# Patient Record
Sex: MALE | Race: WHITE | URBAN - METROPOLITAN AREA
[De-identification: names, ages, dates, MRNs, and addresses within clinical notes are randomized per-mention and may not be internally consistent; named-entity substitution may affect disease eponyms.]

---

## 2021-04-13 ENCOUNTER — EMERGENCY (EMERGENCY)
Facility: HOSPITAL | Age: 21
LOS: 0 days | Discharge: HOME | End: 2021-04-13
Attending: EMERGENCY MEDICINE
Payer: COMMERCIAL

## 2021-04-13 VITALS
OXYGEN SATURATION: 100 % | SYSTOLIC BLOOD PRESSURE: 134 MMHG | RESPIRATION RATE: 18 BRPM | HEART RATE: 95 BPM | HEIGHT: 70 IN | DIASTOLIC BLOOD PRESSURE: 92 MMHG | TEMPERATURE: 98 F | WEIGHT: 199.96 LBS

## 2021-04-13 VITALS
HEART RATE: 95 BPM | DIASTOLIC BLOOD PRESSURE: 87 MMHG | RESPIRATION RATE: 17 BRPM | SYSTOLIC BLOOD PRESSURE: 127 MMHG | OXYGEN SATURATION: 100 % | TEMPERATURE: 98 F

## 2021-04-13 DIAGNOSIS — Z91.018 ALLERGY TO OTHER FOODS: ICD-10-CM
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
## 2021-04-13 DIAGNOSIS — E78.5 HYPERLIPIDEMIA, UNSPECIFIED: ICD-10-CM

## 2021-04-13 DIAGNOSIS — S42.022A DISPLACED FRACTURE OF SHAFT OF LEFT CLAVICLE, INITIAL ENCOUNTER FOR CLOSED FRACTURE: ICD-10-CM
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
## 2021-04-13 DIAGNOSIS — M25.512 PAIN IN LEFT SHOULDER: ICD-10-CM

## 2021-04-13 PROCEDURE — 71046 X-RAY EXAM CHEST 2 VIEWS: CPT | Mod: 26

## 2021-04-13 PROCEDURE — 73030 X-RAY EXAM OF SHOULDER: CPT | Mod: 26,LT

## 2021-04-13 PROCEDURE — 99284 EMERGENCY DEPT VISIT MOD MDM: CPT

## 2021-04-13 PROCEDURE — 73610 X-RAY EXAM OF ANKLE: CPT | Mod: LT

## 2021-04-13 PROCEDURE — 73610 X-RAY EXAM OF ANKLE: CPT | Mod: 26,LT

## 2021-04-13 PROCEDURE — 73000 X-RAY EXAM OF COLLAR BONE: CPT | Mod: 26,LT

## 2021-04-13 PROCEDURE — 73030 X-RAY EXAM OF SHOULDER: CPT | Mod: LT

## 2021-04-13 PROCEDURE — 73000 X-RAY EXAM OF COLLAR BONE: CPT | Mod: LT

## 2021-04-13 PROCEDURE — 99284 EMERGENCY DEPT VISIT MOD MDM: CPT | Mod: 25

## 2021-04-13 PROCEDURE — 71046 X-RAY EXAM CHEST 2 VIEWS: CPT

## 2021-04-13 NOTE — ED PROVIDER NOTE - CLINICAL SUMMARY MEDICAL DECISION MAKING FREE TEXT BOX
[place] CT head and c-spine recommends against imaging. Pt with clavicle fracture on imaging here, neurovascularly intact LUE, no other pain to chest or abdomen to suggest internal injury. VSS. Discussed with ortho who recommends sling and f/u with Dr. Thornton. Pt comfortable with pain. Will d/c home with return precautions. [facility] CT head and c-spine recommends against imaging. Pt with clavicle fracture on imaging here, neurovascularly intact LUE, no other pain to chest or abdomen to suggest internal injury. VSS. Discussed with ortho who recommends sling and f/u with Dr. Thornton. Pt comfortable with plan.  No indication for further imaging at this time.  Understands indications to return. Will d/c home with return precautions.

## 2021-04-13 NOTE — ED PROVIDER NOTE - PATIENT PORTAL LINK FT
You can access the FollowMyHealth Patient Portal offered by [facility] by registering at the following website: [email or web address]. By joining [facility]’s FollowMyHealth portal, you will also be able to view your health information using other applications (apps) compatible with our system.
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
## 2021-04-13 NOTE — ED PROVIDER NOTE - CARE PROVIDER_API CALL
Leonel Rawls)  Orthopaedic Surgery; Surgery of the Hand  [address] [place] [address]  Phone: [phone]  Fax: [phone]  Follow Up Time: 1-3 Days

## 2021-04-13 NOTE — ED ADULT NURSE REASSESSMENT NOTE - NS ED NURSE REASSESS COMMENT FT1
Pt received at 1900. Pt AA&Ox3. VS stable. Pt c/o left clavicle pain, left ankle pain. Pt denies dizziness, vision changes, headache neck pain, CP, SOB. No other complains at this time.
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
## 2021-08-04 ENCOUNTER — TRANSCRIPTION ENCOUNTER (OUTPATIENT)
Age: 21
End: 2021-08-04

## 2021-09-12 ENCOUNTER — TRANSCRIPTION ENCOUNTER (OUTPATIENT)
Age: 21
End: 2021-09-12

## 2022-10-11 NOTE — ED ADULT NURSE NOTE - CAS DISCH TRANSFER METHOD
Pt aware of lab results and recommendations to continue current regimen. Understanding voiced. 
Transportation service

## 2023-01-01 ENCOUNTER — NON-APPOINTMENT (OUTPATIENT)
Age: 23
End: 2023-01-01

## 2024-11-26 NOTE — ED ADULT NURSE NOTE - NS ED PATIENT SAFETY CONCERN
60-year-old female, former smoker with past medical history of asthma, hypothyroidism, anxiety, depression, MVP, Jaclyn-en-Y gastric bypass, appendectomy, cholecystectomy and Chronic back pain on methadone, presents to the ED following a syncopal episode last night. She also has complaints of worsening abdominal pain for 5 days.       Plan  # Abdominal pain with   #h/o Jaclyn en Y bypass   - unclear etiology.  >> resolved   - CT abdomen - Hepatomegaly   - EGD/ Colonoscopy (May '23) - Non erosive gastritis  - c/w pantoprazole   - EGD- nonerosive gastritis in stomach  - CTa abdomen pending read  - pain control per pain management. stopped  po morphine.   - bariatric surgery recs appreciated.     # Acute Hypoxic Respiratory Failure likely due to   # Asthma/ COPD exacerbation.  vs ACOS   #h/o MVP (follows Dr. Negrete)  #HTN  - CTA chest - no PE  - TTE --> echo 60-65% EF  - currently on O2 2L NC > titrate off as tolerated.    Ambulating 88% on RA > will recheck today.,     Resting 93% RA   - Taper down on  prednisone   - c/w albuterol prn   - c/w advair and symbicort  - TTE with bubbles 11/19: suspected pulmonary shunt.      per pulm f/u OP for PFT     #Syncope likely vasovagal vs dehydration  - ECG with prolonged qtc, no obvious ischemia, trops x2 negative  - no events on tele  - tele discontinued, Neg Orthostatics .     #Hypothyroidism,   - home meds - 88mcg thyroxine   - TSH 9.88  - increased to 100 mcg synthroid     #anxiety, depression,  - c/w lamotrigine and zoloft      #Chronic back pain   - on methadone 30mg   -  po morphine 6mg q4h PRN as per pain management    #Alcohol use disorder  # Suspected Thiamine deficiency   - 2-3 drinks per day  - c/w b12 and folate   - started thiamine     # Obesity >    #MISC  VTE ppx - heparin SC  GI ppx - pantoprazole (home meds)  Skin rash- nystain topical powder twice daily   Activity as tolerated  regular diet per pt requests  Full Code      Pending: Home O2 / RVP   Dispo:  pt prefers going home.   Plan d/w the patient at bedside.    60-year-old female, former smoker with past medical history of asthma, hypothyroidism, anxiety, depression, MVP, Jaclyn-en-Y gastric bypass, appendectomy, cholecystectomy and Chronic back pain on methadone, presents to the ED following a syncopal episode last night. She also has complaints of worsening abdominal pain for 5 days.       Plan  # Abdominal pain with   #h/o Jaclyn en Y bypass   - unclear etiology.  >> resolved   - CT abdomen - Hepatomegaly   - EGD/ Colonoscopy (May '23) - Non erosive gastritis  - c/w pantoprazole   - EGD- nonerosive gastritis in stomach  - CTa abdomen pending read  - pain control per pain management. stopped  po morphine.   - bariatric surgery recs appreciated.     # Acute Hypoxic Respiratory Failure likely due to   # Asthma/ COPD exacerbation.  vs ACOS   #h/o MVP (follows Dr. Negrete)  #HTN  - CTA chest - no PE  - TTE --> echo 60-65% EF  - currently on O2 2L NC > titrate off as tolerated.    Ambulating 88% on RA > will recheck today.,     Resting 93% RA   - Taper down on  prednisone   - c/w albuterol prn   - c/w advair and symbicort  - TTE with bubbles 11/19: suspected pulmonary shunt.      per pulm f/u OP for PFT     #Syncope likely vasovagal vs dehydration  - ECG with prolonged qtc, no obvious ischemia, trops x2 negative  - no events on tele  - tele discontinued, Neg Orthostatics .     #Rash  -RLQ panus fold   -started on nystatin powder BID     #Hypothyroidism,   - home meds - 88mcg thyroxine   - TSH 9.88  - increased to 100 mcg synthroid     #anxiety, depression,  - c/w lamotrigine and zoloft      #Chronic back pain   - on methadone 30mg   -  po morphine 6mg q4h PRN as per pain management    #Alcohol use disorder  # Suspected Thiamine deficiency   - 2-3 drinks per day  - c/w b12 and folate   - started thiamine     # Obesity >    #MISC  VTE ppx - heparin SC  GI ppx - pantoprazole (home meds)  Activity as tolerated  regular diet per pt requests  Full Code      Pending: Home O2 / RVP   Dispo:  pt prefers going home.   Plan d/w the patient at bedside.    No

## 2024-12-23 NOTE — ED ADULT TRIAGE NOTE - HEIGHT IN INCHES
Last seen 9/18/2024  Next appt 1/6/2025    Requested Prescriptions     Pending Prescriptions Disp Refills    empagliflozin (JARDIANCE) 10 MG tablet 90 tablet 0     Sig: Take 1 tablet by mouth daily        
10

## 2025-09-09 ENCOUNTER — NON-APPOINTMENT (OUTPATIENT)
Age: 25
End: 2025-09-09